# Patient Record
(demographics unavailable — no encounter records)

---

## 2018-10-22 NOTE — [facility]
Stationary ECG Study

                           [facility] - ED

                                       

                                       Test Date:    2018-10-22

Pat Name:     HANNAH VU              Department:   

Patient ID:   [id number]                 Room:         -

Gender:       M                        Technician:   

:          1975               Requested By: DARLING HAYES

Order Number: [id number]     Reading MD:   Leny Silva

                                 Measurements

Intervals                              Axis          

Rate:         103                      P:            35

LA:           107                      QRS:          95

QRSD:         88                       T:            113

QT:           321                                    

QTc:          421                                    

                           Interpretive Statements

SINUS TACHYCARDIA WITH SHORT LA INTERVAL

BORDERLINE RIGHT AXIS DEVIATION

NONSPECIFIC T-WAVE ABNORMALITY

ABNORMAL RHYTHM ECG

INCREASED RATE 16

Electronically Signed On 10- 13:24:04 EDT by Leny Silva